# Patient Record
Sex: MALE | URBAN - METROPOLITAN AREA
[De-identification: names, ages, dates, MRNs, and addresses within clinical notes are randomized per-mention and may not be internally consistent; named-entity substitution may affect disease eponyms.]

---

## 2023-05-31 ENCOUNTER — TESTING ONLY (OUTPATIENT)
Dept: URBAN - METROPOLITAN AREA CLINIC 72 | Facility: CLINIC | Age: 43
End: 2023-05-31
Payer: COMMERCIAL

## 2023-05-31 DIAGNOSIS — H52.13 MYOPIA, BILATERAL: Primary | ICD-10-CM

## 2023-05-31 DIAGNOSIS — H04.123 DRY EYE SYNDROME OF BILATERAL LACRIMAL GLANDS: ICD-10-CM

## 2023-05-31 PROCEDURE — 99202 OFFICE O/P NEW SF 15 MIN: CPT

## 2023-05-31 ASSESSMENT — VISUAL ACUITY
OD: 20/20
OS: 20/20

## 2023-05-31 NOTE — IMPRESSION/PLAN
Impression: Myopia, bilateral: H52.13. Plan: Dispensed new glasses Rx today and discussed changes and possible adaptation period. Patient to call if any issues with new glasses occur.  

Pt to RTC for CL's when he is ready